# Patient Record
Sex: MALE | Race: WHITE
[De-identification: names, ages, dates, MRNs, and addresses within clinical notes are randomized per-mention and may not be internally consistent; named-entity substitution may affect disease eponyms.]

---

## 2019-02-02 ENCOUNTER — HOSPITAL ENCOUNTER (EMERGENCY)
Dept: HOSPITAL 59 - ER | Age: 29
Discharge: HOME | End: 2019-02-02
Payer: COMMERCIAL
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
## 2019-02-02 DIAGNOSIS — S05.01XA: Primary | ICD-10-CM
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
## 2019-02-02 PROCEDURE — 99283 EMERGENCY DEPT VISIT LOW MDM: CPT

## 2019-02-02 PROCEDURE — 90715 TDAP VACCINE 7 YRS/> IM: CPT

## 2019-02-02 NOTE — EMERGENCY DEPARTMENT RECORD
History of Present Illness





- General


Chief complaint: Eye Problem


Stated complaint: RT EYE INJURY


Time Seen by Provider: 19 07:52


Source: Patient


Mode of Arrival: Ambulatory


Limitations: No limitations





- History of Present Illness


Initial comments: 


The patient was cleaning his room 5 hours ago and a small cardboard box 

accidentally scratched his R eye. Since he has had eye pain and mildly blurred 

vision. He does not wear contacts and his last Td was 9 years ago.





MD chief complaint: Eye pain


Onset/Timin


-: Hour(s)


Onset Description: Sudden


Location: Right eye


Place: Home


If Injury: None


Eye Symptoms: Foreign body sensation, Pain


Severity: Mild


Severity scale (1-10): 4


If Pain, Quality: Aching


Consistency: Constant


Associated Symptoms: None


Treatments Prior to Arrival: None





- Related Data


Visual acuity (L) = 20/: 25


Visual acuity (R) = 20/: 30


With correction: No


Hx Tetanus Toxoid Vaccination: Yes


Year of Tetanus Vaccination: 


Patient Tetanus UTD (within 5 yrs): Yes


 Home Medications











 Medication  Instructions  Recorded  Confirmed  Last Taken


 


No Home Med [NO HOME MEDS]  19 Unknown











 Allergies











Allergy/AdvReac Type Severity Reaction Status Date / Time


 


No Known Allergies Allergy  no Verified 19 08:36





   allergies  














Travel Screening





- Travel/Exposure Within Last 30 Days


Have you traveled within the last 30 days?: No





Review of Systems


Constitutional: Denies: Chills, Fever





Past Medical History





- SOCIAL HISTORY


Smoking Status: Never smoker


Alcohol Use: None


Drug Use: None





- RESPIRATORY


Hx Respiratory Disorders: No





- CARDIOVASCULAR


Hx Cardio Disorders: No





- NEURO


Hx Neuro Disorders: No





- GI


Hx GI Disorders: No





- 


Hx Genitourinary Disorders: No





- ENDOCRINE


Hx Endocrine Disorders: No





- MUSCULOSKELETAL


Hx Musculoskeletal Disorders: No





- PSYCH


Hx Psych Problems: No





- HEMATOLOGY/ONCOLOGY


Hx Hematology/Oncology Disorders: No





Family Medical History


Any Significant Family History?: No





Physical Exam





- General


General Appearance: Alert, Oriented x3, Cooperative, No acute distress





- Head


Head exam: Atraumatic, Normocephalic





- Eye


Eye exam: PERRL, Conjunctival injection (mild R eye.), EOMI, Other (There is an 

abrasion to the R cornea on flourescein staining.).  negative: Normal appearance


Visual acuity (L) = 20/: 25


Visual acuity (R) = 20/: 30


With correction: No


Image of Eyes: 


  __________________________














  __________________________





 1 - Area of small linear abrasion.








Course





 Vital Signs











  19





  07:55 08:11


 


Temperature 98.0 F 


 


Pulse Rate 83 


 


Pulse Rate [  59 L





Pulse Ox Probe]  


 


Respiratory 18 





Rate  


 


Blood Pressure 176/114 


 


Blood Pressure  153/97





[Left Arm]  


 


Pulse Ox 98 














- Reevaluation(s)


Reevaluation #1: 


I did explain to the patient that he will need to keep his eye closed and use 

the Abx ointment as directed. He is to return to the ER or see his eye doctor 

on Monday if not better.


19 08:14








Disposition


Disposition: Discharge


Clinical Impression: 


Corneal abrasion, right


Qualifiers:


 Encounter type: initial encounter Qualified Code(s): S05.01XA - Injury of 

conjunctiva and corneal abrasion without foreign body, right eye, initial 

encounter





Disposition: Home, Self-Care


Condition: (2) Stable


Instructions:  Corneal Abrasion (ED)


Additional Instructions: 


Please use Tylenol or Motrin for pain and use the Emycin ointment in the R eye 

4 times a day for 5 days. Please return to the ER or see an eye doctor on 

Monday if not better. Return to the ER for any worsening symptoms. Please see 

your family doctor in 1-2 weeks to have your blood pressure rechecked.


Forms:  Patient Portal Access


Time of Disposition: 08:17





Quality





- Quality Measures


Quality Measures: N/A





- Blood Pressure Screening


View Details: Yes


Does Patient Have Any of the Following: No


Blood Pressure Classification: Hypertensive Reading


Systolic Measurement: 176


Diastolic Measurement: 114


Screening for High Blood Pressure: < First Hypertensive BP, F/U Documented > [

]


First Hypertensive Follow-up Interventions: Referral to alternative/primary 

care provider.